# Patient Record
Sex: FEMALE | Race: WHITE | Employment: PART TIME | URBAN - NONMETROPOLITAN AREA
[De-identification: names, ages, dates, MRNs, and addresses within clinical notes are randomized per-mention and may not be internally consistent; named-entity substitution may affect disease eponyms.]

---

## 2017-05-16 ENCOUNTER — OFFICE VISIT (OUTPATIENT)
Dept: FAMILY MEDICINE CLINIC | Facility: CLINIC | Age: 42
End: 2017-05-16

## 2017-05-16 VITALS — DIASTOLIC BLOOD PRESSURE: 80 MMHG | TEMPERATURE: 99 F | WEIGHT: 223.5 LBS | SYSTOLIC BLOOD PRESSURE: 140 MMHG

## 2017-05-16 DIAGNOSIS — N63.10 BREAST MASS, RIGHT: Primary | ICD-10-CM

## 2017-05-16 PROCEDURE — 99243 OFF/OP CNSLTJ NEW/EST LOW 30: CPT | Performed by: SURGERY

## 2017-05-16 RX ORDER — FLUTICASONE PROPIONATE 50 MCG
SPRAY, SUSPENSION (ML) NASAL
COMMUNITY
Start: 2017-04-27 | End: 2018-10-31 | Stop reason: ALTCHOICE

## 2017-05-16 RX ORDER — LABETALOL 200 MG/1
TABLET, FILM COATED ORAL
COMMUNITY
Start: 2017-04-21

## 2017-05-16 NOTE — PROGRESS NOTES
Palpable- yes    MSBE- yes    AGE MENARCHE-18 yr  AGE MENOPAUSE-  no  TOTAL-    G P 3/2  AGE AT 1ST PREGNANCY-  27year old  BCP- yes          HRT-no    ETOH- occasionally  TOBACCO- no  CAFFEINE-  1 cup coffee, and 1 to 2 diet pepsi's per day; 12 ounce can

## 2017-05-16 NOTE — PROGRESS NOTES
Bianka Faustin is a 46year old female  Patient presents with:  Consult: lump on each breast; patient can feel both, has had for 2 to 3 months, right breast is painful      REFERRED BY    Patient presents with B/L breast masses the one the R is getting b normocephalic, atraumatic, TMs clear, nares patent, mouth moist, pharynx w/o erythema  NECK: supple, no JVD, no adenopathy, no thyromegaly  RESPIRATORY: clear to auscultation, no rales , rhonchi or wheezing  CARDIOVASCULAR: RRR, murmur negative  ABDOMEN: n

## 2017-05-17 ENCOUNTER — TELEPHONE (OUTPATIENT)
Dept: SURGERY | Facility: CLINIC | Age: 42
End: 2017-05-17

## 2017-05-17 DIAGNOSIS — N63.0 BREAST MASS: Primary | ICD-10-CM

## 2017-05-26 ENCOUNTER — MED REC SCAN ONLY (OUTPATIENT)
Dept: FAMILY MEDICINE CLINIC | Facility: CLINIC | Age: 42
End: 2017-05-26

## 2017-05-26 ENCOUNTER — TELEPHONE (OUTPATIENT)
Dept: FAMILY MEDICINE CLINIC | Facility: CLINIC | Age: 42
End: 2017-05-26

## 2017-05-26 NOTE — TELEPHONE ENCOUNTER
VCM:[id number]  DX: N63    I called [employer] and spoke to Becca Mendoza. No auth required for the above CPT code.  Ref # [id number]. Belva Seip

## 2017-06-02 ENCOUNTER — MED REC SCAN ONLY (OUTPATIENT)
Dept: FAMILY MEDICINE CLINIC | Facility: CLINIC | Age: 42
End: 2017-06-02

## 2017-06-12 ENCOUNTER — ANESTHESIA (OUTPATIENT)
Dept: SURGERY | Facility: HOSPITAL | Age: 42
End: 2017-06-12
Payer: COMMERCIAL

## 2017-06-12 ENCOUNTER — ANESTHESIA EVENT (OUTPATIENT)
Dept: SURGERY | Facility: HOSPITAL | Age: 42
End: 2017-06-12
Payer: COMMERCIAL

## 2017-06-12 ENCOUNTER — HOSPITAL ENCOUNTER (OUTPATIENT)
Facility: HOSPITAL | Age: 42
Setting detail: HOSPITAL OUTPATIENT SURGERY
Discharge: HOME | End: 2017-06-12
Attending: SURGERY | Admitting: SURGERY
Payer: COMMERCIAL

## 2017-06-12 ENCOUNTER — SURGERY (OUTPATIENT)
Age: 42
End: 2017-06-12

## 2017-06-12 VITALS
SYSTOLIC BLOOD PRESSURE: 109 MMHG | BODY MASS INDEX: 38.67 KG/M2 | DIASTOLIC BLOOD PRESSURE: 71 MMHG | RESPIRATION RATE: 18 BRPM | OXYGEN SATURATION: 99 % | TEMPERATURE: 98 F | WEIGHT: 218.25 LBS | HEART RATE: 77 BPM | HEIGHT: 63 IN

## 2017-06-12 DIAGNOSIS — N63.0 BREAST MASS: ICD-10-CM

## 2017-06-12 PROCEDURE — 19120 REMOVAL OF BREAST LESION: CPT | Performed by: SURGERY

## 2017-06-12 PROCEDURE — 0HBT0ZX EXCISION OF RIGHT BREAST, OPEN APPROACH, DIAGNOSTIC: ICD-10-PCS | Performed by: SURGERY

## 2017-06-12 RX ORDER — ACETAMINOPHEN 500 MG
1000 TABLET ORAL ONCE AS NEEDED
Status: DISCONTINUED | OUTPATIENT
Start: 2017-06-12 | End: 2017-06-12

## 2017-06-12 RX ORDER — SODIUM CHLORIDE, SODIUM LACTATE, POTASSIUM CHLORIDE, CALCIUM CHLORIDE 600; 310; 30; 20 MG/100ML; MG/100ML; MG/100ML; MG/100ML
INJECTION, SOLUTION INTRAVENOUS CONTINUOUS
Status: DISCONTINUED | OUTPATIENT
Start: 2017-06-12 | End: 2017-06-12

## 2017-06-12 RX ORDER — CLINDAMYCIN PHOSPHATE 900 MG/50ML
900 INJECTION INTRAVENOUS ONCE
Status: COMPLETED | OUTPATIENT
Start: 2017-06-12 | End: 2017-06-12

## 2017-06-12 RX ORDER — MEPERIDINE HYDROCHLORIDE 25 MG/ML
12.5 INJECTION INTRAMUSCULAR; INTRAVENOUS; SUBCUTANEOUS AS NEEDED
Status: DISCONTINUED | OUTPATIENT
Start: 2017-06-12 | End: 2017-06-12

## 2017-06-12 RX ORDER — TRAMADOL HYDROCHLORIDE 50 MG/1
TABLET ORAL EVERY 4 HOURS PRN
Qty: 30 TABLET | Refills: 0 | Status: SHIPPED | OUTPATIENT
Start: 2017-06-12 | End: 2018-10-31

## 2017-06-12 RX ORDER — BUPIVACAINE HYDROCHLORIDE 5 MG/ML
INJECTION, SOLUTION EPIDURAL; INTRACAUDAL AS NEEDED
Status: DISCONTINUED | OUTPATIENT
Start: 2017-06-12 | End: 2017-06-12 | Stop reason: HOSPADM

## 2017-06-12 RX ORDER — METOCLOPRAMIDE HYDROCHLORIDE 5 MG/ML
INJECTION INTRAMUSCULAR; INTRAVENOUS
Status: DISCONTINUED | OUTPATIENT
Start: 2017-06-12 | End: 2017-06-12 | Stop reason: HOSPADM

## 2017-06-12 RX ORDER — LIDOCAINE HYDROCHLORIDE AND EPINEPHRINE 10; 10 MG/ML; UG/ML
INJECTION, SOLUTION INFILTRATION; PERINEURAL AS NEEDED
Status: DISCONTINUED | OUTPATIENT
Start: 2017-06-12 | End: 2017-06-12 | Stop reason: HOSPADM

## 2017-06-12 RX ORDER — ONDANSETRON 2 MG/ML
4 INJECTION INTRAMUSCULAR; INTRAVENOUS AS NEEDED
Status: DISCONTINUED | OUTPATIENT
Start: 2017-06-12 | End: 2017-06-12

## 2017-06-12 RX ORDER — NALOXONE HYDROCHLORIDE 0.4 MG/ML
80 INJECTION, SOLUTION INTRAMUSCULAR; INTRAVENOUS; SUBCUTANEOUS AS NEEDED
Status: DISCONTINUED | OUTPATIENT
Start: 2017-06-12 | End: 2017-06-12

## 2017-06-12 NOTE — OPERATIVE REPORT
[facility]    PATIENT'S NAME: Reanna Hardin   ATTENDING PHYSICIAN: Verona Cha D.O.   OPERATING PHYSICIAN: Verona Cha D.O.   PATIENT ACCOUNT#:   [de-identified]    LOCATION:  [address] [place] [address] EDW 10  MEDICAL RECORD #:   [id number]

## 2017-06-12 NOTE — ANESTHESIA PREPROCEDURE EVALUATION
PRE-OP EVALUATION    Patient Name: Janeth [place]    Pre-op Diagnosis: Breast mass [N63]    Procedure(s):  RIGHT BREAST BIOPSY    Surgeon(s) and Role:     Joelle Smiley, DO - Primary    Pre-op vitals reviewed.   Temp: 98.7 °F (37.1 °C)  Pulse: 86 Dental             Pulmonary      Breath sounds clear to auscultation bilaterally. (+) decreased breath sounds      (+) coarse breath sounds   Other findings            ASA: 3   Plan: MAC  NPO status verified and patient meets guidelines.         Com

## 2017-06-12 NOTE — BRIEF OP NOTE
Pre-Operative Diagnosis: Breast mass [N63]Right     Post-Operative Diagnosis: Breast mass [N63]right     Procedure Performed:   Procedure(s):  RIGHT BREAST BIOPSY    Surgeon(s) and Role:     * Vince Comer DO - Marli    Assistant(s):        Surgica

## 2017-06-12 NOTE — ANESTHESIA POSTPROCEDURE EVALUATION
[facility] Dani Martinez Patient Status:  Hospital Outpatient Surgery   Age/Gender 46year old female MRN [id number]   [facility] SURGERY Attending Ovi Rogers, [address] [facility][note 3] Day # 0 PCP None Pcp       Anesthesia Post-op Note

## 2017-06-12 NOTE — H&P
Patient presents with B/L breast masses the one the R is getting bigger    The left was identifed by dr Claudette Rojas office    Pos pain at the R side no drainage  Family history of breast cancer paternal grandmother unknown age    Ovarian cancer no      B auscultation, no rales , rhonchi or wheezing  CARDIOVASCULAR: RRR, murmur negative  ABDOMEN: normal active BS, soft, nondistended  no HSM, no masses or hernias  LYMPHATIC: no axillary , supraclavicular or inguinal lymphadenopathy  EXTREMITIES: no cyanosis,

## 2017-06-20 ENCOUNTER — OFFICE VISIT (OUTPATIENT)
Dept: FAMILY MEDICINE CLINIC | Facility: CLINIC | Age: 42
End: 2017-06-20

## 2017-06-20 VITALS
DIASTOLIC BLOOD PRESSURE: 84 MMHG | BODY MASS INDEX: 39 KG/M2 | SYSTOLIC BLOOD PRESSURE: 114 MMHG | TEMPERATURE: 100 F | WEIGHT: 218 LBS

## 2017-06-20 DIAGNOSIS — N63.0 BREAST MASS: Primary | ICD-10-CM

## 2017-06-20 PROCEDURE — 99024 POSTOP FOLLOW-UP VISIT: CPT | Performed by: SURGERY

## 2017-06-20 RX ORDER — CLINDAMYCIN HYDROCHLORIDE 150 MG/1
150 CAPSULE ORAL 3 TIMES DAILY
Qty: 30 CAPSULE | Refills: 0 | Status: SHIPPED | OUTPATIENT
Start: 2017-06-20 | End: 2017-06-30

## 2017-06-22 NOTE — PROGRESS NOTES
Wound with minor purulence at the wound edges Steri-Strips removed surrounding mild erythema impression rule out wound infection plan clindamycin as patient is penicillin allergic follow-up in 1 week's time.   Continue local wound care washing daily dry gau

## 2018-10-01 NOTE — H&P
Kaila Elliott is a 38year old female  No chief complaint on file.       REFERRED BY    Patient presents with abnormal mammogram  Patient has positive grandmother diagnosed with breast cancer unknown age  Age of menarche is 17  Patient is  2 Propionate 50 MCG/ACT Nasal Suspension 2 sprays each nostril daily PRN for allergies Disp:  Rfl:    Labetalol HCl 200 MG Oral Tab One tab po BID Disp:  Rfl:    Valsartan (DIOVAN) 160 MG Oral Tab Take 160 mg by mouth daily.  Disp:  Rfl:    Levonorgestrel (MI lymphadenopathy  EXTREMITIES: no cyanosis, clubbing or edema, no atrophy, full ROM  Breast: No masses palpated right or left definitively.   Patient points to a spot approximately 2 cm from the areolar edge at the 4 o'clock position of the right breast.  ST mutually decided to observe this for 1 month's time and reexamine prior to biopsy       Meds & Refills for this Visit:  Requested Prescriptions      No prescriptions requested or ordered in this encounter       Imaging & Consults:  None

## 2018-10-02 ENCOUNTER — OFFICE VISIT (OUTPATIENT)
Dept: SURGERY | Facility: CLINIC | Age: 43
End: 2018-10-02
Payer: COMMERCIAL

## 2018-10-02 VITALS — HEIGHT: 63 IN | TEMPERATURE: 99 F | WEIGHT: 197 LBS | BODY MASS INDEX: 34.91 KG/M2 | HEART RATE: 80 BPM

## 2018-10-02 DIAGNOSIS — N63.0 BREAST MASS: Primary | ICD-10-CM

## 2018-10-02 PROCEDURE — 99244 OFF/OP CNSLTJ NEW/EST MOD 40: CPT | Performed by: SURGERY

## 2018-10-30 ENCOUNTER — OFFICE VISIT (OUTPATIENT)
Dept: SURGERY | Facility: CLINIC | Age: 43
End: 2018-10-30
Payer: COMMERCIAL

## 2018-10-30 VITALS — HEIGHT: 63 IN | WEIGHT: 195 LBS | TEMPERATURE: 98 F | BODY MASS INDEX: 34.55 KG/M2 | HEART RATE: 74 BPM

## 2018-10-30 DIAGNOSIS — N63.10 BREAST MASS, RIGHT: Primary | ICD-10-CM

## 2018-10-30 PROCEDURE — 99214 OFFICE O/P EST MOD 30 MIN: CPT | Performed by: SURGERY

## 2018-10-30 NOTE — PROGRESS NOTES
[facility]  Progress Note    Kaila Curry Dyson Patient Status:  No patient class for patient encounter    1975 MRN [id number]   Location 2014 [facility] Attending No att. providers found   Hosp Day # 0 PCP

## 2018-11-02 ENCOUNTER — TELEPHONE (OUTPATIENT)
Dept: SURGERY | Facility: CLINIC | Age: 43
End: 2018-11-02

## 2018-11-08 ENCOUNTER — ANESTHESIA EVENT (OUTPATIENT)
Dept: SURGERY | Facility: HOSPITAL | Age: 43
End: 2018-11-08
Payer: COMMERCIAL

## 2018-11-09 ENCOUNTER — ANESTHESIA (OUTPATIENT)
Dept: SURGERY | Facility: HOSPITAL | Age: 43
End: 2018-11-09
Payer: COMMERCIAL

## 2018-11-09 ENCOUNTER — HOSPITAL ENCOUNTER (OUTPATIENT)
Facility: HOSPITAL | Age: 43
Setting detail: HOSPITAL OUTPATIENT SURGERY
Discharge: HOME | End: 2018-11-09
Attending: SURGERY | Admitting: SURGERY
Payer: COMMERCIAL

## 2018-11-09 VITALS
WEIGHT: 194.88 LBS | BODY MASS INDEX: 34.53 KG/M2 | HEIGHT: 63 IN | HEART RATE: 68 BPM | OXYGEN SATURATION: 94 % | SYSTOLIC BLOOD PRESSURE: 150 MMHG | TEMPERATURE: 98 F | DIASTOLIC BLOOD PRESSURE: 62 MMHG | RESPIRATION RATE: 20 BRPM

## 2018-11-09 DIAGNOSIS — N63.10 BREAST MASS, RIGHT: ICD-10-CM

## 2018-11-09 PROCEDURE — 81025 URINE PREGNANCY TEST: CPT | Performed by: SURGERY

## 2018-11-09 PROCEDURE — 88305 TISSUE EXAM BY PATHOLOGIST: CPT | Performed by: SURGERY

## 2018-11-09 PROCEDURE — 0HBT0ZX EXCISION OF RIGHT BREAST, OPEN APPROACH, DIAGNOSTIC: ICD-10-PCS | Performed by: SURGERY

## 2018-11-09 RX ORDER — ACETAMINOPHEN AND CODEINE PHOSPHATE 300; 30 MG/1; MG/1
1-2 TABLET ORAL EVERY 4 HOURS PRN
Qty: 20 TABLET | Refills: 0 | Status: SHIPPED | OUTPATIENT
Start: 2018-11-09 | End: 2018-11-20

## 2018-11-09 RX ORDER — LIDOCAINE HYDROCHLORIDE AND EPINEPHRINE 10; 10 MG/ML; UG/ML
INJECTION, SOLUTION INFILTRATION; PERINEURAL AS NEEDED
Status: DISCONTINUED | OUTPATIENT
Start: 2018-11-09 | End: 2018-11-09 | Stop reason: HOSPADM

## 2018-11-09 RX ORDER — ACETAMINOPHEN AND CODEINE PHOSPHATE 300; 30 MG/1; MG/1
1 TABLET ORAL ONCE
Status: COMPLETED | OUTPATIENT
Start: 2018-11-09 | End: 2018-11-09

## 2018-11-09 RX ORDER — MORPHINE SULFATE 4 MG/ML
2 INJECTION, SOLUTION INTRAMUSCULAR; INTRAVENOUS EVERY 5 MIN PRN
Status: DISCONTINUED | OUTPATIENT
Start: 2018-11-09 | End: 2018-11-09

## 2018-11-09 RX ORDER — ACETAMINOPHEN 500 MG
1000 TABLET ORAL ONCE
COMMUNITY

## 2018-11-09 RX ORDER — NALOXONE HYDROCHLORIDE 0.4 MG/ML
80 INJECTION, SOLUTION INTRAMUSCULAR; INTRAVENOUS; SUBCUTANEOUS AS NEEDED
Status: DISCONTINUED | OUTPATIENT
Start: 2018-11-09 | End: 2018-11-09

## 2018-11-09 RX ORDER — SODIUM CHLORIDE, SODIUM LACTATE, POTASSIUM CHLORIDE, CALCIUM CHLORIDE 600; 310; 30; 20 MG/100ML; MG/100ML; MG/100ML; MG/100ML
INJECTION, SOLUTION INTRAVENOUS CONTINUOUS
Status: DISCONTINUED | OUTPATIENT
Start: 2018-11-09 | End: 2018-11-09

## 2018-11-09 RX ORDER — MIDAZOLAM HYDROCHLORIDE 1 MG/ML
1 INJECTION INTRAMUSCULAR; INTRAVENOUS EVERY 5 MIN PRN
Status: DISCONTINUED | OUTPATIENT
Start: 2018-11-09 | End: 2018-11-09

## 2018-11-09 RX ORDER — ACETAMINOPHEN AND CODEINE PHOSPHATE 300; 30 MG/1; MG/1
2 TABLET ORAL ONCE
Status: COMPLETED | OUTPATIENT
Start: 2018-11-09 | End: 2018-11-09

## 2018-11-09 RX ORDER — ACETAMINOPHEN 500 MG
1000 TABLET ORAL ONCE
Status: DISCONTINUED | OUTPATIENT
Start: 2018-11-09 | End: 2018-11-09 | Stop reason: HOSPADM

## 2018-11-09 RX ORDER — ONDANSETRON 2 MG/ML
4 INJECTION INTRAMUSCULAR; INTRAVENOUS AS NEEDED
Status: DISCONTINUED | OUTPATIENT
Start: 2018-11-09 | End: 2018-11-09

## 2018-11-09 RX ORDER — CLINDAMYCIN PHOSPHATE 900 MG/50ML
900 INJECTION INTRAVENOUS ONCE
Status: COMPLETED | OUTPATIENT
Start: 2018-11-09 | End: 2018-11-09

## 2018-11-09 RX ORDER — ACETAMINOPHEN AND CODEINE PHOSPHATE 300; 30 MG/1; MG/1
TABLET ORAL
Status: DISCONTINUED
Start: 2018-11-09 | End: 2018-11-09

## 2018-11-09 RX ORDER — BUPIVACAINE HYDROCHLORIDE 5 MG/ML
INJECTION, SOLUTION EPIDURAL; INTRACAUDAL AS NEEDED
Status: DISCONTINUED | OUTPATIENT
Start: 2018-11-09 | End: 2018-11-09 | Stop reason: HOSPADM

## 2018-11-09 NOTE — ANESTHESIA POSTPROCEDURE EVALUATION
[facility] oJvani Robles Patient Status:  Hospital Outpatient Surgery   Age/Gender 38year old female MRN [id number]   [facility] SURGERY Attending Yudith Abel, [address] [facility] Day # 0 PCP Renato Walton MD, MD       Anesthesia

## 2018-11-09 NOTE — H&P
Patient states the mass persists. It has not decreased in size nor increased in size. It is persistent it is not painful she notices it most commonly in the shower     Objective/Physical Exam:     [unfilled]     General: Alert, orientated x3.   Cooperative breast mass  Plan: Excisional right breast biopsy discussed with patient risks of bleeding and infection. I spent  45  minutes face to face with the patient. More than 50% of that time was spent counseling the patient and/or on coordination of care.  The d

## 2018-11-09 NOTE — BRIEF OP NOTE
Pre-Operative Diagnosis: Breast mass, right [N63.10]     Post-Operative Diagnosis: * No post-op diagnosis entered *      Procedure Performed:   Procedure(s):  RIGHT BREAST BIOPSY    Surgeon(s) and Role:     * Eduarda Zamora, DO - Primary    Assistant(s):

## 2018-11-09 NOTE — OPERATIVE REPORT
[facility]    PATIENT'S NAME: Reanna Liriano   ATTENDING PHYSICIAN: Alena Barnes D.O.   OPERATING PHYSICIAN: Alena Barnes D.O.   PATIENT ACCOUNT#:   [de-identified]    LOCATION:  [address] [place] [address] 6 EDWP 10  MEDICAL RECORD #:   [id number] condition.      Dictated By Rosalva Grimes D.O.  d: 11/09/2018 13:33:23  t: 11/09/2018 15:51:50  Job [id number]  RM/    cc: Griselda Smith D.O.

## 2018-11-09 NOTE — ANESTHESIA PREPROCEDURE EVALUATION
PRE-OP EVALUATION    Patient Name: Link Carter    Pre-op Diagnosis: Breast mass, right [N63.10]    Procedure(s):  RIGHT BREAST BIOPSY    Surgeon(s) and Role:     Rosaura Bermudez, DO - Primary    Pre-op vitals reviewed.         Body mass index is Social History    Tobacco Use      Smoking status: Former Smoker        Packs/day: 0.50        Years: 1.00        Pack years: .5      Smokeless tobacco: Never Used      Tobacco comment: quit 10 years ago    Alcohol use: Yes      Comment: occasional

## 2018-11-20 ENCOUNTER — OFFICE VISIT (OUTPATIENT)
Dept: SURGERY | Facility: CLINIC | Age: 43
End: 2018-11-20
Payer: COMMERCIAL

## 2018-11-20 VITALS — HEART RATE: 88 BPM | HEIGHT: 63 IN | BODY MASS INDEX: 34.37 KG/M2 | TEMPERATURE: 99 F | WEIGHT: 194 LBS

## 2018-11-20 DIAGNOSIS — N63.10 BREAST MASS, RIGHT: Primary | ICD-10-CM

## 2018-11-20 PROCEDURE — 99024 POSTOP FOLLOW-UP VISIT: CPT | Performed by: SURGERY

## 2022-03-06 NOTE — MR AVS SNAPSHOT
[address] [place] [id number] [phone]               Thank you for choosing us for your health care visit with Matt Roberson DO.   We are glad to serve you and happy to provide you with this summary of your v visit,  view other health information, and more. To sign up or find more information, go to [email or web address] and click on the Sign Up Now link in the [employer] box.      Enter your [employer] Activation Code exactly as it appears below along with yo 06-Mar-2022 00:47

## 2024-01-11 ENCOUNTER — OFFICE VISIT (OUTPATIENT)
Dept: SURGERY | Facility: CLINIC | Age: 49
End: 2024-01-11
Payer: COMMERCIAL

## 2024-01-11 VITALS — HEART RATE: 84 BPM | TEMPERATURE: 98 F

## 2024-01-11 DIAGNOSIS — N64.89 BREAST ASYMMETRY: Primary | ICD-10-CM

## 2024-01-11 PROCEDURE — 99204 OFFICE O/P NEW MOD 45 MIN: CPT | Performed by: SURGERY

## 2024-01-11 NOTE — H&P
Kaila Allison is a 48 year old female  Chief Complaint   Patient presents with    Breast Problem     F/u after Mammogram and breast ultrasound done Dec at [facility]. C/o right breast lump       REFERRED BY    Patient presents with abnormal mammogram demonstrating asymmetry of the right breast.  Patient underwent follow-up mammogram over the past year and a half demonstrating stable breast asymmetry ultrasound demonstrated also a small cyst of the right breast patient denies feeling a mass right or left breast.  Denies change in her breast history previously recorded.       .           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    Esophageal reflux     Essential hypertension     Gluten intolerance     Hearing impairment     bilateral hearing loss    High blood pressure     History of blood transfusion 1990    age 14 during chemo    Meningitis     Ovarian cyst     Ovarian tumor, malignant (HCC)     GERMCELL TUMOR-PT RECIEVED CHEMO    Personal history of antineoplastic chemotherapy     1990 4 cycles of chemo    Renal disorder     stage 3 ckd    Sleep apnea     cpap    Unspecified essential hypertension     Visual impairment     glasses     Past Surgical History:   Procedure Laterality Date    BREAST BIOPSY Right 11/09/2018    OTHER      Germ cell tumor on ovary removed age 14; followed with 4 rounds chemo at [facility]    OTHER SURGICAL HISTORY      laparoscopy FOR OVARAIN CYST    OTHER SURGICAL HISTORY      OVARIAN TUMOR REMOVAL AT AGE 13    OTHER SURGICAL HISTORY      cysts removed from ovaries on 2 separate surgeries per [facility]; D/C after miscarriage; LH3D nerve reattachment by Dr mai at [facility]     Current Outpatient Medications on File Prior to Visit   Medication Sig Dispense Refill    acetaminophen 500 MG Oral Tab Take 1,000 mg by mouth one time.      Labetalol HCl 200 MG Oral Tab One tab po BID       No current  facility-administered medications on file prior to visit.     @ALL@  No family history on file.  Social History     Socioeconomic History    Marital status:    Tobacco Use    Smoking status: Former     Packs/day: 0.50     Years: 1.00     Additional pack years: 0.00     Total pack years: 0.50     Types: Cigarettes    Smokeless tobacco: Never    Tobacco comments:     quit 10 years ago   Substance and Sexual Activity    Alcohol use: Yes     Comment: occasional    Drug use: No   Social History Narrative    ** Merged History Encounter **            ROS     GENERAL HEALTH: otherwise feels well, no weight loss, no fever or chills  SKIN: denies any unusual skin rashes or jaundice  HEENT: denies nasal congestion, sinus pain or sore throat; hearing loss negative,   EYES , no diplopia or vision changes  RESPIRATORY: denies shortness of breath, wheezing or cough   CARDIOVASCULAR: denies chest pain or ARRIAZA; no palpitations   GI: denies nausea, vomiting, constipation, diarrhea; no rectal bleeding; no heartburn  GENITAL/: no dysuria, urgency or frequency, no tea colored urine  MUSCULOSKELETAL: no joint complaints upper or lower extremities  HEMATOLOGY: denies hx anemia; denies bruising or excessive bleeding  Endocrine: no weight gain or loss no hot or cold intolerance    EXAM     Pulse 84, temperature 98.2 °F (36.8 °C), temperature source Temporal, not currently breastfeeding.  GENERAL: well developed, well nourished female, in no apparent distress.    MENTAL STATUS :Alert, oriented x 3  PSYCH: normal mood and affect  SKIN: anicteric, no rashes, no bruising  EYES: PERRLA, EOMI, sclera anicteric,  conjunctiva without pallor  HEENT: normocephalic, atraumatic, TMs clear, nares patent, mouth moist, pharynx w/o erythema  NECK: supple, no JVD, no adenopathy, no thyromegaly  RESPIRATORY: clear to auscultation, no rales , rhonchi or wheezing  CARDIOVASCULAR: RRR, murmur negative  ABDOMEN: normal active BS, soft, nondistended  no  HSM, no masses or hernias  LYMPHATIC: no axillary , supraclavicular or inguinal lymphadenopathy  EXTREMITIES: no cyanosis, clubbing or edema, no atrophy, full ROM  Breast examination performed with Jacque LOPEZ demonstrates no evidence of masses right or left.  STUDIES:     Admission on 11/09/2018, Discharged on 11/09/2018   Component Date Value Ref Range Status    POCT urine pregnancy 11/09/2018 Negative   Final    POCT LOT NUMBER 11/09/2018 [id number]   Final    Procedure Control 11/09/2018 ok   Final    EXPIRATION DATE 11/09/2018 2,020/4   Final    Case Report 11/09/2018    Final                    Value:Surgical Pathology                                Case: [id number]                                   Authorizing Provider:  John Cuevas DO         Collected:           11/09/2018 01:20 PM          Ordering Location:     [facility] Surgery    Received:            11/09/2018 01:40 PM          Pathologist:           Bernadette Rosario MD                                                                Specimen:    Breast, right, Right breast biopsy                                                         Final Diagnosis: 11/09/2018    Final                    Value:This result contains rich text formatting which cannot be displayed here.    Final Diagnosis Comment 11/09/2018    Final                    Value:This result contains rich text formatting which cannot be displayed here.    Clinical Information 11/09/2018    Final                    Value:This result contains rich text formatting which cannot be displayed here.    Gross Description 11/09/2018    Final                    Value:This result contains rich text formatting which cannot be displayed here.       ASSESSMENT   Imp: Stable asymmetry of the bilateral breasts no evidence of mass formation normal breast exam  PLan: No immediate plans for follow-up breast examination is normal next imaging will be 1 year from now discussed with patient she is in agreement  with the plan      Meds & Refills for this Visit:  Requested Prescriptions      No prescriptions requested or ordered in this encounter       Imaging & Consults:  None

## 2024-02-05 ENCOUNTER — PATIENT OUTREACH (OUTPATIENT)
Facility: LOCATION | Age: 49
End: 2024-02-05

## (undated) DEVICE — SUTURE VICRYL 3-0 SH

## (undated) DEVICE — GAMMEX® NON-LATEX PI TEXTURED SIZE 7.5, STERILE POLYISOPRENE POWDER-FREE SURGICAL GLOVE: Brand: GAMMEX

## (undated) DEVICE — NEEDLE ELECTRODE: Brand: EDGE

## (undated) DEVICE — BREAST-HERNIA-PORT CDS-LF: Brand: MEDLINE INDUSTRIES, INC.

## (undated) DEVICE — 3M™ TEGADERM™ TRANSPARENT FILM DRESSING, 1626W, 4 IN X 4-3/4 IN (10 CM X 12 CM), 50 EACH/CARTON, 4 CARTON/CASE: Brand: 3M™ TEGADERM™

## (undated) DEVICE — SOL  .9 1000ML BTL

## (undated) DEVICE — SUTURE MONOCRYL 4-0 PS-2

## (undated) DEVICE — KENDALL SCD EXPRESS SLEEVES, KNEE LENGTH, MEDIUM: Brand: KENDALL SCD

## (undated) DEVICE — GAUZE SPONGES,USP TYPE VII GAUZE, 12 PLY: Brand: CURITY

## (undated) DEVICE — BANDAGE ELASTIC ACE 6\" X-LONG

## (undated) DEVICE — GLOVE BIOGEL M SURG SZ 71/2

## (undated) NOTE — IP AVS SNAPSHOT
[facility] [place] [address] [place], [address] ~ [phone]                Discharge Summary   6/12/2017    Ana Maria Rider [facility]           Admission Information        Provider Department    6/12/2017 DO Elijah Byrd Pre-Op / Tylenol #3 or Norco.  The patient can do this by taking two Tylenol, then three hours later taking two Advil, then three hours later taking Tylenol again. Please ask your surgeon before resuming blood thinners such as aspirin, Plavix or Coumadin.   All oth of their appointment. Patients should wear a supportive bra, even at night, for two weeks. The best support is with a sports bra. No golfing, tennis, racquetball, volleyball, or extreme sports for two weeks.     APPOINTMENT  Please call our office for an Thank you for choosing [facility]  It was a pleasure taking care of you          Jemima Poole [place]        Discharge References/Attachments     TRAMADOL TABLETS (ENGLISH)      Instructions and Information about Your Health     None      Follow-up Information     [facility] Now link in the [employer] Packs. Enter your [employer] Activation Code exactly as it appears below along with your Zip Code and Date of Birth to complete the sign-up process. If you do not sign up before the expiration date, you must request a new code.     Miguel Castelan

## (undated) NOTE — LETTER
Last Revised 02/07/06  Obstructive Sleep Apnea Questionnaire    Clinical signs and symptoms suggesting the possibility of AYAAN    1. Predisposing physical characteristics (positive with any of the following present)  ? BMI 35kg/m²  ?  Craniofacial abnormalit observer, patient regularly falls asleep within minutes after being left unstimulated) in which case they should be treated as though they have severe sleep apnea.     The sleep laboratory’s assessment (none, mild, moderate, or severe) should take precedenc postoperative opioids.                Opioid requirement             Points   None 0    Low dose oral opiod 1    High dose oral opioids, parenteral or neuraxial opiods 3      Point Total for C        Estimation of Perioperative Risk     Overall Score   =

## (undated) NOTE — MR AVS SNAPSHOT
[facility]  [address] [address]  [phone]               Thank you for choosing us for your health care visit with Kindra Rogers DO.   We are glad to serve you and happy to provide you with this summary If you have questions, you can call [phone] to talk to our [facility] Staff. Remember, [employer] is NOT to be used for urgent needs. For medical emergencies, dial 911.         Educational Information     Healthy Diet and Regular Exercise  The [employer]